# Patient Record
Sex: MALE | Race: WHITE | Employment: FULL TIME | URBAN - METROPOLITAN AREA
[De-identification: names, ages, dates, MRNs, and addresses within clinical notes are randomized per-mention and may not be internally consistent; named-entity substitution may affect disease eponyms.]

---

## 2020-04-21 PROBLEM — R21 RASH: Status: ACTIVE | Noted: 2020-04-21

## 2021-01-21 PROBLEM — R21 RASH: Status: RESOLVED | Noted: 2020-04-21 | Resolved: 2021-01-21

## 2023-05-14 NOTE — LETTER
OSR/BRIAN Notification    Patient Name: Semaj Grajeda  -Age / Sex: 10/26/1955-A: 69 y  male  Medical Records: [id number] [facility]: [id number]    Surgeon(s):  Surgeon(s):  Jarrod Ferrara MD   Procedure: COLONOSCOPY    Anesthesia Type: MAC Surgery Date: 2024    During the pre-operative screening process using the STOP-Bang questionnaire, the patient listed above was identified as being at high risk for obstructive sleep apnea.    If you feel it is appropriate to schedule a sleep study, the [facility] will give priority to patients scheduled for procedures to minimize surgical delays.     If a sleep study is completed prior to surgery, please fax the results/plan of care to Pre-Admission Testing ([phone]) as this information will be utilized in clinical decision-making regarding the patient's upcoming surgery.    Thank you for your assistance in helping us provide a safe, seamless and personal experience for your patient.    Fab Rose MD  , Department of Anesthesia     PAST SURGICAL HISTORY:  No significant past surgical history

## 2024-10-22 RX ORDER — ATORVASTATIN CALCIUM 80 MG/1
80 TABLET, FILM COATED ORAL NIGHTLY
COMMUNITY

## 2024-10-22 RX ORDER — GLIPIZIDE 10 MG/1
10 TABLET ORAL
COMMUNITY

## 2024-10-22 RX ORDER — HYDROCHLOROTHIAZIDE 12.5 MG/1
12.5 CAPSULE ORAL DAILY
COMMUNITY

## 2024-10-22 RX ORDER — METOPROLOL SUCCINATE 100 MG/1
100 TABLET, EXTENDED RELEASE ORAL DAILY
COMMUNITY

## 2024-10-22 RX ORDER — LISINOPRIL 10 MG/1
10 TABLET ORAL DAILY
COMMUNITY

## 2024-10-22 RX ORDER — ORAL SEMAGLUTIDE 14 MG/1
1 TABLET ORAL DAILY
COMMUNITY

## 2024-10-22 RX ORDER — EZETIMIBE 10 MG/1
10 TABLET ORAL NIGHTLY
COMMUNITY

## 2024-11-12 NOTE — H&P
REFERRING PHYSICIAN: Dr. Griffith     HPI:  Semaj Grajeda is a 68 year old male. Consult requested by Dr. Griffith for chief complaint of cologard positive stool.  The patient was noted to have cologard positive stool on testing dated 9/23.  GI complaints at present include none.  Other GI symptoms in the past have included none.  Prior GI work up in the past includes none.                Hasn't seen cardiology for 2 years.     PAST GI HISTORY: Negative   Prior GI endoscopies None     Allergy:   Allergies   No Known Allergies            Current Outpatient Medications   Medication Sig Dispense Refill    metFORMIN HCl 1000 MG Oral Tab Take 1 tablet (1,000 mg total) by mouth 2 (two) times daily with meals. 200 tablet 0    lisinopril 10 MG Oral Tab Take 1 tablet (10 mg total) by mouth daily. 100 tablet 0    glipiZIDE 10 MG Oral Tab Take 1 tablet (10 mg total) by mouth 2 (two) times daily before meals. 180 tablet 0    metoprolol succinate  MG Oral Tablet 24 Hr TAKE 1 TABLET(100 MG) BY MOUTH DAILY 90 tablet 1    ticagrelor (BRILINTA) 90 MG Oral Tab Take 1 tablet (90 mg total) by mouth Q12H. 180 tablet 1    Semaglutide (RYBELSUS) 14 MG Oral Tab Take 1 tablet by mouth daily. 90 tablet 1    atorvastatin 80 MG Oral Tab TAKE 1 TABLET(80 MG) BY MOUTH EVERY NIGHT 100 tablet 1    Glucose Blood (CONTOUR NEXT TEST) In Vitro Strip USE ONCE DAILY 100 strip 3    hydrochlorothiazide 12.5 MG Oral Tab Take 1 tablet (12.5 mg total) by mouth daily. 90 tablet 1    ezetimibe 10 MG Oral Tab Take 1 tablet (10 mg total) by mouth every morning. 90 tablet 1    Microlet Lancets Does not apply Misc Check blood sugar once daily 100 each 1    aspirin 81 MG Oral Chew Tab Chew by mouth daily.               Past Medical History:   Diagnosis Date    Diabetes (HCC)      Hyperlipidemia      Hypertension     Hx of BRIAN - mild        Past Surgical History:   Procedure Laterality Date    STENT PTCA JASMIN ART OR BRANC   2022         Social History     Tobacco Use      Smoking status: Every Day        Types: Cigarettes, Cigars      Smokeless tobacco: Current    Vaping Use      Vaping status: Never Used    Alcohol use: Yes      Alcohol/week: 4.0 standard drinks of alcohol      Types: 4 Cans of beer per week      Comment: friday nights    Drug use: Never     Occupation: works part time  Marital status:   Aspirin and/ or NSAID use:  asa and brilinta     FAMILY HX: GI HX: None, no hx of colon cancer  No family history on file.     REVIEW OF SYSTEMS:  GENERAL: feels well otherwise  SKIN: denies any unusual skin lesions  EYES: denies blurred vision or double vision  HEENT: denies nasal congestion, sinus pain or ST  LUNGS: denies shortness of breath with exertion  CARDIOVASCULAR: denies chest pain on exertion  GI: as above  : denies nocturia or changes in stream  MUSCULOSKELETAL: denies back pain  NEURO: denies headaches  PSYCHE: denies depression or anxiety  HEMATOLOGIC: denies hx of anemia  ENDOCRINE: denies thyroid history  ALL/ASTHMA: denies hx of allergy or asthma     EXAM:  There were no vitals taken for this visit.  GENERAL: well developed, well nourished, in no apparent distress  SKIN: no rashes, no suspicious lesions  HEENT: atraumatic, normocephalic, ears and throat are clear  NECK: supple, no adenopathy, no bruits  CHEST: no chest tenderness  LUNGS: clear to auscultation  CARDIO: RRR; holosystolic murmur  GI: good BS's and no masses, HSM or tenderness  RECTAL: Exam not done.  MUSCULOSKELETAL: back is not tender,FROM of the back  EXTREMITIES: no cyanosis, clubbing or edema        Assessment:    Cologard positive stool               The patient was found to have cologard positive stool on recent outpt stool testing testing.  This test is a stool screening test looking for genetic mutations associated with colonic neoplasms, both benign and malignant.  The test is highly predictive for the presence of precancerous colon polyps and colon cancer.   Colonoscopy will be performed to further evaluation.     Plan:  Colonoscopy with MAC (possible BRIAN) at [facility] as pt is an ASA 3 will be scheduled in the next 1 month at the patient's earliest convenience.  Risks of bleeding and perforation  were discussed.                 Will contact cardiology to approve holding brilinta for 5 days prior to the endoscopy.                Hold rybelsys for 1 week prior to the colonoscopy.               Pt advised to follow up with cardiology for routine care - hasn't been seen in 2 years.

## 2024-11-12 NOTE — OPERATIVE REPORT
PATIENT NAME: Semaj Grajeda  MRN: [id number]  DATE OF OPERATION:  11/13/24  REFERRING PHYSICIAN: Dr. Griffith  Medications:  MAC  Date of last COLONOSCOPY:  none  PREOPERATIVE DIAGNOSIS                Cologuard positive stool  POSTOPERATIVE DIAGNOSIS:  1 cm transverse colon polyp and 1.5 cm transverse colon polyp, both removed via hot snare.  Otherwise normal colon exam.    PROCEDURE PERFORMED:               Colonoscopy with hot snare polypectomy   Endoscopist:                                             Jarrod Ferrara MD     PROCEDURE AND FINDINGS: The patient was placed into the left lateral decubitus after informed consent was obtained.  All questions were answered.  An updated history and physical were performed and an ASA score of 3 was assigned.  Informed consent was obtained prior to the procedure, with review of possible complications including bleeding, infection, and missed polyps and or cancer.  MAC sedation was administered.    A digital rectal exam was performed and was normal.  The video adult colonoscope was passed from anus to cecum.  The ileocecal valve, appendiceal orfice, hepatic and splenic flexures were all well visualized.  The bowel preparation was rated on the Aronchick bowel prep scale as 1. (1 - excellent > 95% mucosa seen; 2 - good - clear liquid up to 25% of the mucosa, 90% mucosa seen;  3 - fair - semisolid stool not suctioned, but 90% of the mucosa seen; 4 - poor - semisolid stool not suctioned, but < 90% mucosa seen; 5 - inadequate - repeat prep needed) .     Findings included a 1 cm transverse colon polyp and a 1.5 cm transverse colon polyp, both removed via hot snare.  Otherwise normal colon exam.  On retroflexion of the scope in the anorectum, normal internal hemorrhoids were noted.     The scope withdrawal from cecum to anus was 15 minutes.    RECOMMENDATIONS         1. The patient will be provided with a written summary of today's endoscopic findings.        2. The patient will  be notified with biopsy results in 2 - 4 working days.         3. Ok to resume brillinta on 11/14.        4. Recommendations regarding repeat colonoscopy will be made once the biopsy results are reviewed.      Jarrod Ferrara MD, Gastroenterologist  Cc: Dr. Griffith

## 2024-11-13 ENCOUNTER — ANESTHESIA EVENT (OUTPATIENT)
Dept: ENDOSCOPY | Facility: HOSPITAL | Age: 69
End: 2024-11-13
Payer: COMMERCIAL

## 2024-11-13 ENCOUNTER — HOSPITAL ENCOUNTER (OUTPATIENT)
Facility: HOSPITAL | Age: 69
Setting detail: HOSPITAL OUTPATIENT SURGERY
Discharge: HOME | End: 2024-11-13
Attending: INTERNAL MEDICINE | Admitting: INTERNAL MEDICINE
Payer: COMMERCIAL

## 2024-11-13 ENCOUNTER — ANESTHESIA (OUTPATIENT)
Dept: ENDOSCOPY | Facility: HOSPITAL | Age: 69
End: 2024-11-13
Payer: COMMERCIAL

## 2024-11-13 VITALS
HEIGHT: 68 IN | BODY MASS INDEX: 42.28 KG/M2 | RESPIRATION RATE: 18 BRPM | HEART RATE: 82 BPM | DIASTOLIC BLOOD PRESSURE: 80 MMHG | SYSTOLIC BLOOD PRESSURE: 129 MMHG | WEIGHT: 279 LBS | TEMPERATURE: 98 F | OXYGEN SATURATION: 95 %

## 2024-11-13 LAB — GLUCOSE BLD-MCNC: 193 MG/DL (ref 70–99)

## 2024-11-13 PROCEDURE — 82962 GLUCOSE BLOOD TEST: CPT

## 2024-11-13 PROCEDURE — 0DBL8ZX EXCISION OF TRANSVERSE COLON, VIA NATURAL OR ARTIFICIAL OPENING ENDOSCOPIC, DIAGNOSTIC: ICD-10-PCS | Performed by: INTERNAL MEDICINE

## 2024-11-13 PROCEDURE — 88305 TISSUE EXAM BY PATHOLOGIST: CPT | Performed by: INTERNAL MEDICINE

## 2024-11-13 RX ORDER — DEXTROSE MONOHYDRATE 25 G/50ML
50 INJECTION, SOLUTION INTRAVENOUS
Status: DISCONTINUED | OUTPATIENT
Start: 2024-11-13 | End: 2024-11-13

## 2024-11-13 RX ORDER — NICOTINE POLACRILEX 4 MG
15 LOZENGE BUCCAL
Status: DISCONTINUED | OUTPATIENT
Start: 2024-11-13 | End: 2024-11-13

## 2024-11-13 RX ORDER — SODIUM CHLORIDE, SODIUM LACTATE, POTASSIUM CHLORIDE, CALCIUM CHLORIDE 600; 310; 30; 20 MG/100ML; MG/100ML; MG/100ML; MG/100ML
INJECTION, SOLUTION INTRAVENOUS CONTINUOUS
Status: DISCONTINUED | OUTPATIENT
Start: 2024-11-13 | End: 2024-11-13

## 2024-11-13 RX ORDER — NALOXONE HYDROCHLORIDE 0.4 MG/ML
0.08 INJECTION, SOLUTION INTRAMUSCULAR; INTRAVENOUS; SUBCUTANEOUS ONCE AS NEEDED
Status: DISCONTINUED | OUTPATIENT
Start: 2024-11-13 | End: 2024-11-13

## 2024-11-13 RX ORDER — NICOTINE POLACRILEX 4 MG
30 LOZENGE BUCCAL
Status: DISCONTINUED | OUTPATIENT
Start: 2024-11-13 | End: 2024-11-13

## 2024-11-13 RX ADMIN — SODIUM CHLORIDE, SODIUM LACTATE, POTASSIUM CHLORIDE, CALCIUM CHLORIDE: 600; 310; 30; 20 INJECTION, SOLUTION INTRAVENOUS at 12:36:00

## 2024-11-13 NOTE — DISCHARGE INSTRUCTIONS
ENDOSCOPY DISCHARGE INSTRUCTIONS    Procedure Performed:   Colonoscopy  Endoscopist: No name on file  FINDINGS:   Colon polyp(s) (a growth in the colon)    MEDICATIONS:  You may resume all other medications today    DIET:  General    BIOPSIES:  Biopsies were taken (you will be notified of results in 7-10 days)      ADDITIONAL RECOMMENDATIONS:  Resume brillinta on 11/14.  Recommendations regarding repeat colonoscopy will be made once the biopsy results are reviewed.      Activity for remainder of today:    REST TODAY  DO NOT drive or operate heavy machinery  DO NOT drink any alcoholic beverages  DO NOT sign any legal documents or make any important decisions    After your procedure(s):  It is not unusual to feel bloated or gassy .  Passing gas and belching is encouraged. Lying on your left side with your knees flexed may relieve the discomfort. A hot pack to the abdomen may also help.    After your gastroscopy (upper endoscopy): You may experience a slight sore throat which will subside. Throat lozenges or salt water gargle can be used.    FOLLOW-UP:  Contact the office at [phone] for follow-up appointment if needed or if you develop any of the following:    Severe abdominal pain/discomfort       Excessive bleeding or black tarry stool  Difficulty breathing or swallowing        Persistent nausea,vomiting, or a fever above 100 degrees or chills

## 2024-11-13 NOTE — BRIEF OP NOTE
Pre-Operative Diagnosis: POSITIVE COLORECTAL CANCER SCREENING USING COLOGARD TEST     Post-Operative Diagnosis: Polyps      Procedure Performed:   COLONOSCOPY with hot snare polypectomy    Surgeons and Role:     * Jarrod Ferrara MD - Primary    Assistant(s):        Surgical Findings: see above     Specimen: transverse colon polyps     Estimated Blood Loss: No data recorded    Dictation Number:  none    Jarrod Ferrara MD  11/13/2024  1:12 PM

## 2024-11-13 NOTE — ANESTHESIA POSTPROCEDURE EVALUATION
[facility]    Semaj Grajeda Patient Status:  Hospital Outpatient Surgery   Age/Gender 69 year old male MRN [id number]   Location [facility] Attending Jarrod Ferrara MD   Hosp Day # 0 PCP Erick Griffith MD       Anesthesia Post-op Note    COLONOSCOPY with hot snare polypectomy    Procedure Summary       Date: 11/13/24 Room / Location:  ENDOSCOPY 03 / [facility] END[facility]    Anesthesia Start: 1236 Anesthesia Stop: 1308    Procedure: COLONOSCOPY with hot snare polypectomy Diagnosis: (Polyps)    Surgeons: Jarrod Ferrara MD Anesthesiologist: Nikhil Acosta MD    Anesthesia Type: MAC ASA Status: 3            Anesthesia Type: MAC    Vitals Value Taken Time   /55 11/13/24 1309   Temp  11/13/24 1309   Pulse 86 11/13/24 1308   Resp 18 11/13/24 1309   SpO2 96 % 11/13/24 1309   Vitals shown include unfiled device data.    Patient Location: Endoscopy    Anesthesia Type: MAC    Airway Patency: patent    Postop Pain Control: adequate    Mental Status: mildly sedated but able to meaningfully participate in the post-anesthesia evaluation    Nausea/Vomiting: none    Cardiopulmonary/Hydration status: stable euvolemic    Complications: no apparent anesthesia related complications    Postop vital signs: stable    Dental Exam: Unchanged from Preop    Patient to be discharged home when criteria met.

## 2024-11-13 NOTE — ANESTHESIA PREPROCEDURE EVALUATION
PRE-OP EVALUATION    Patient Name: Semaj Grajeda    Admit Diagnosis: POSITIVE COLORECTAL CANCER SCREENING USING COLOGARD TEST    Pre-op Diagnosis: POSITIVE COLORECTAL CANCER SCREENING USING COLOGARD TEST    COLONOSCOPY    Anesthesia Procedure: COLONOSCOPY    Surgeons and Role:     * Jarrod Ferrara MD - Primary    Pre-op vitals reviewed.        Body mass index is 41.81 kg/m².    Current medications reviewed.  Hospital Medications:   glucose (Dex4) 15 GM/59ML oral liquid 15 g  15 g Oral Q15 Min PRN    Or    glucose (Glutose) 40% oral gel 15 g  15 g Oral Q15 Min PRN    Or    glucose-vitamin C (Dex-4) chewable tab 4 tablet  4 tablet Oral Q15 Min PRN    Or    dextrose 50% injection 50 mL  50 mL Intravenous Q15 Min PRN    Or    glucose (Dex4) 15 GM/59ML oral liquid 30 g  30 g Oral Q15 Min PRN    Or    glucose (Glutose) 40% oral gel 30 g  30 g Oral Q15 Min PRN    Or    glucose-vitamin C (Dex-4) chewable tab 8 tablet  8 tablet Oral Q15 Min PRN    lactated ringers infusion   Intravenous Continuous       Outpatient Medications:   Prescriptions Prior to Admission[1]    Allergies: Adhesive tape      Anesthesia Evaluation        Anesthetic Complications           GI/Hepatic/Renal                                 Cardiovascular        Exercise tolerance: good     MET: >4    (+) obesity  (+) hypertension   (+) hyperlipidemia                                  Endo/Other      (+) diabetes and well controlled, type 2, not using insulin                         Pulmonary    Negative pulmonary ROS.                       Neuro/Psych                              CAD s/p stents 2008, 2022, and 2023: last dose anticoagulation 1 week ago.          Past Surgical History:   Procedure Laterality Date    Cath bare metal stent (bms)       Social History     Socioeconomic History    Marital status:    Tobacco Use    Smoking status: Some Days     Types: Cigars    Smokeless tobacco: Current    Tobacco comments:     3 cigars per week    Vaping Use    Vaping status: Never Used   Substance and Sexual Activity    Alcohol use: Yes     Comment: once per week    Drug use: Never     History   Drug Use Unknown     Available pre-op labs reviewed.               Airway      Mallampati: III  Mouth opening: >3 FB  TM distance: > 6 cm  Neck ROM: full Cardiovascular    Cardiovascular exam normal.         Dental    Dentition appears grossly intact         Pulmonary    Pulmonary exam normal.                 Other findings              ASA: 3   Plan: MAC  NPO status verified and patient meets guidelines.          Plan/risks discussed with: patient                Present on Admission:  **None**             [1]   Medications Prior to Admission   Medication Sig Dispense Refill Last Dose/Taking    Semaglutide (RYBELSUS) 14 MG Oral Tab Take 1 tablet by mouth daily.   Taking    glipiZIDE 10 MG Oral Tab Take 1 tablet (10 mg total) by mouth 2 (two) times daily before meals.   Taking    ezetimibe 10 MG Oral Tab Take 1 tablet (10 mg total) by mouth nightly.   Taking    ticagrelor 90 MG Oral Tab Take 1 tablet (90 mg total) by mouth every 12 (twelve) hours.   Taking    metoprolol succinate  MG Oral Tablet 24 Hr Take 1 tablet (100 mg total) by mouth daily.   Taking    hydroCHLOROthiazide 12.5 MG Oral Cap Take 1 capsule (12.5 mg total) by mouth daily.   Taking    lisinopril 10 MG Oral Tab Take 1 tablet (10 mg total) by mouth daily.   Taking    atorvastatin 80 MG Oral Tab Take 1 tablet (80 mg total) by mouth nightly.   Taking    METFORMIN HCL 1000 MG Oral Tab TAKE 1 TABLET(1000 MG) BY MOUTH TWICE DAILY WITH MEALS 60 tablet 0 Taking    aspirin 81 MG Oral Chew Tab Chew 1 tablet (81 mg total) by mouth daily.   Taking    Microlet Lancets Does not apply Misc Check blood sugar once daily 100 each 1     Glucose Blood (ACCU-CHEK MARIA ALEJANDRA PLUS) In Vitro Strip Accu-Chek Maria Alejandra Plus test strips-check blood sugar levels once daily 100 strip 1

## (undated) DEVICE — V2 SPECIMEN COLLECTION MANIFOLD KIT: Brand: NEPTUNE

## (undated) DEVICE — REM POLYHESIVE ADULT PATIENT RETURN ELECTRODE: Brand: VALLEYLAB

## (undated) DEVICE — 1200CC GUARDIAN II: Brand: GUARDIAN

## (undated) DEVICE — 3M™ RED DOT™ MONITORING ELECTRODE WITH FOAM TAPE AND STICKY GEL, 50/BAG, 20/CASE, 72/PLT 2570: Brand: RED DOT™

## (undated) DEVICE — LASSO POLYPECTOMY SNARE: Brand: LASSO

## (undated) DEVICE — 10FT COMBINED O2 DELIVERY/CO2 MONITORING. FILTER WITH MICROSTREAM TYPE LUER: Brand: DUAL ADULT NASAL CANNULA

## (undated) DEVICE — KIT VLV 5 PC AIR H2O SUCT BX ENDOGATOR CONN

## (undated) DEVICE — KIT CUSTOM ENDOPROCEDURE STERIS